# Patient Record
Sex: MALE | Race: AMERICAN INDIAN OR ALASKA NATIVE | NOT HISPANIC OR LATINO | URBAN - METROPOLITAN AREA
[De-identification: names, ages, dates, MRNs, and addresses within clinical notes are randomized per-mention and may not be internally consistent; named-entity substitution may affect disease eponyms.]

---

## 2019-09-15 ENCOUNTER — INPATIENT (INPATIENT)
Facility: HOSPITAL | Age: 59
LOS: 2 days | Discharge: HOME | End: 2019-09-18
Attending: INTERNAL MEDICINE | Admitting: INTERNAL MEDICINE
Payer: COMMERCIAL

## 2019-09-15 VITALS
DIASTOLIC BLOOD PRESSURE: 77 MMHG | SYSTOLIC BLOOD PRESSURE: 133 MMHG | TEMPERATURE: 97 F | HEART RATE: 71 BPM | OXYGEN SATURATION: 100 % | RESPIRATION RATE: 18 BRPM

## 2019-09-15 LAB
ALBUMIN SERPL ELPH-MCNC: 4.3 G/DL — SIGNIFICANT CHANGE UP (ref 3.5–5.2)
ALP SERPL-CCNC: 64 U/L — SIGNIFICANT CHANGE UP (ref 30–115)
ALT FLD-CCNC: 29 U/L — SIGNIFICANT CHANGE UP (ref 0–41)
ANION GAP SERPL CALC-SCNC: 13 MMOL/L — SIGNIFICANT CHANGE UP (ref 7–14)
AST SERPL-CCNC: 29 U/L — SIGNIFICANT CHANGE UP (ref 0–41)
BASOPHILS # BLD AUTO: 0.02 K/UL — SIGNIFICANT CHANGE UP (ref 0–0.2)
BASOPHILS NFR BLD AUTO: 0.2 % — SIGNIFICANT CHANGE UP (ref 0–1)
BILIRUB SERPL-MCNC: 0.7 MG/DL — SIGNIFICANT CHANGE UP (ref 0.2–1.2)
BUN SERPL-MCNC: 14 MG/DL — SIGNIFICANT CHANGE UP (ref 10–20)
CALCIUM SERPL-MCNC: 9.2 MG/DL — SIGNIFICANT CHANGE UP (ref 8.5–10.1)
CHLORIDE SERPL-SCNC: 95 MMOL/L — LOW (ref 98–110)
CO2 SERPL-SCNC: 22 MMOL/L — SIGNIFICANT CHANGE UP (ref 17–32)
CREAT SERPL-MCNC: 0.8 MG/DL — SIGNIFICANT CHANGE UP (ref 0.7–1.5)
EOSINOPHIL # BLD AUTO: 0.02 K/UL — SIGNIFICANT CHANGE UP (ref 0–0.7)
EOSINOPHIL NFR BLD AUTO: 0.2 % — SIGNIFICANT CHANGE UP (ref 0–8)
GLUCOSE SERPL-MCNC: 126 MG/DL — HIGH (ref 70–99)
HCT VFR BLD CALC: 41.9 % — LOW (ref 42–52)
HGB BLD-MCNC: 15.2 G/DL — SIGNIFICANT CHANGE UP (ref 14–18)
IMM GRANULOCYTES NFR BLD AUTO: 0.4 % — HIGH (ref 0.1–0.3)
LYMPHOCYTES # BLD AUTO: 1.28 K/UL — SIGNIFICANT CHANGE UP (ref 1.2–3.4)
LYMPHOCYTES # BLD AUTO: 13.6 % — LOW (ref 20.5–51.1)
MCHC RBC-ENTMCNC: 34.8 PG — HIGH (ref 27–31)
MCHC RBC-ENTMCNC: 36.3 G/DL — SIGNIFICANT CHANGE UP (ref 32–37)
MCV RBC AUTO: 95.9 FL — HIGH (ref 80–94)
MONOCYTES # BLD AUTO: 0.4 K/UL — SIGNIFICANT CHANGE UP (ref 0.1–0.6)
MONOCYTES NFR BLD AUTO: 4.2 % — SIGNIFICANT CHANGE UP (ref 1.7–9.3)
NEUTROPHILS # BLD AUTO: 7.66 K/UL — HIGH (ref 1.4–6.5)
NEUTROPHILS NFR BLD AUTO: 81.4 % — HIGH (ref 42.2–75.2)
NRBC # BLD: 0 /100 WBCS — SIGNIFICANT CHANGE UP (ref 0–0)
PLATELET # BLD AUTO: 143 K/UL — SIGNIFICANT CHANGE UP (ref 130–400)
POTASSIUM SERPL-MCNC: 4 MMOL/L — SIGNIFICANT CHANGE UP (ref 3.5–5)
POTASSIUM SERPL-SCNC: 4 MMOL/L — SIGNIFICANT CHANGE UP (ref 3.5–5)
PROT SERPL-MCNC: 6.9 G/DL — SIGNIFICANT CHANGE UP (ref 6–8)
RBC # BLD: 4.37 M/UL — LOW (ref 4.7–6.1)
RBC # FLD: 11.3 % — LOW (ref 11.5–14.5)
SODIUM SERPL-SCNC: 130 MMOL/L — LOW (ref 135–146)
TROPONIN T SERPL-MCNC: <0.01 NG/ML — SIGNIFICANT CHANGE UP
WBC # BLD: 9.42 K/UL — SIGNIFICANT CHANGE UP (ref 4.8–10.8)
WBC # FLD AUTO: 9.42 K/UL — SIGNIFICANT CHANGE UP (ref 4.8–10.8)

## 2019-09-15 PROCEDURE — 71046 X-RAY EXAM CHEST 2 VIEWS: CPT | Mod: 26

## 2019-09-15 PROCEDURE — 99218: CPT

## 2019-09-15 PROCEDURE — 93010 ELECTROCARDIOGRAM REPORT: CPT | Mod: 76

## 2019-09-15 RX ORDER — ASPIRIN/CALCIUM CARB/MAGNESIUM 324 MG
162 TABLET ORAL ONCE
Refills: 0 | Status: COMPLETED | OUTPATIENT
Start: 2019-09-15 | End: 2019-09-15

## 2019-09-15 RX ORDER — SODIUM CHLORIDE 9 MG/ML
1000 INJECTION INTRAMUSCULAR; INTRAVENOUS; SUBCUTANEOUS ONCE
Refills: 0 | Status: COMPLETED | OUTPATIENT
Start: 2019-09-15 | End: 2019-09-15

## 2019-09-15 RX ORDER — MECLIZINE HCL 12.5 MG
25 TABLET ORAL ONCE
Refills: 0 | Status: COMPLETED | OUTPATIENT
Start: 2019-09-15 | End: 2019-09-15

## 2019-09-15 RX ADMIN — SODIUM CHLORIDE 2000 MILLILITER(S): 9 INJECTION INTRAMUSCULAR; INTRAVENOUS; SUBCUTANEOUS at 19:14

## 2019-09-15 RX ADMIN — Medication 162 MILLIGRAM(S): at 19:14

## 2019-09-15 RX ADMIN — Medication 25 MILLIGRAM(S): at 19:14

## 2019-09-15 NOTE — ED ADULT TRIAGE NOTE - CHIEF COMPLAINT QUOTE
c/o dizziness, nausea and one episode of vomiting since this am. denies any sob, cp, HA. sent for abnormal EKG

## 2019-09-15 NOTE — ED ADULT NURSE NOTE - OBJECTIVE STATEMENT
Pt complaining of dizziness since this morning. Sent in from primary office, for abnormal EKG. Pt also has decreased appetite with 1 episode of vomiting today.

## 2019-09-15 NOTE — ED PROVIDER NOTE - OBJECTIVE STATEMENT
59 no pmh, former smoker here for dizziness. pt states this morning he felt dizzy which he described as room spinning. pt states sx worse w/ moving head left. sx made worse w/ eating breakfast. pt endorsed vom x1.  pt denies numbness, weakness, ha, visual complaints, ear pain, tinnitus, chest pain, sob. pt went to pcp. ekg at pcp office had isolated RUSSEL in V2 once which improved w/ serial ekg. pt sent in for further eval.  here pt feeling much better but not baseline.

## 2019-09-15 NOTE — ED PROVIDER NOTE - PLAN OF CARE
dizziness likely peripheral vertigo, normal neuro exam. ekg from pcp office reviewed- isolated RUSSEL, no RUSSEL on ED ekg. will get basic labs, supp care for vertigo, trop. if negative, obs for acs r/o

## 2019-09-15 NOTE — ED PROVIDER NOTE - PHYSICAL EXAMINATION
PHYSICAL EXAM:    Constitutional: awake, alert, NAD  Eyes: EOMI, no conj injection  HENT: NC AT   Back: no c/t/l spine ttp  Respiratory: no respiratory distress, breath sounds equal b/l, no wheezing, rhonchi or stridor.   Cardiovascular: RRR nml S1S2  Gastrointestinal: soft, no masses, nontender, nondistended. No guarding or rebound.   Extremities: no peripheral edema  Neurological: AAOx3, CN II-XII grossly intact, no focal numbness or weakness. normal FTN, gait normal, mild horz nystagmus on horz head movement  Skin: no rash  Musculoskeletal: no gross deformity

## 2019-09-15 NOTE — ED PROVIDER NOTE - CLINICAL SUMMARY MEDICAL DECISION MAKING FREE TEXT BOX
pt here for vertiginous dizziness since the morning. pt went to pcp who ref pt to ed for abn ekg. in ED, ekg wnl x2, trop neg, sx improving w/ fluids/meclizine. no fnd on serial neuro exam. will place in obs for acs r/o given EKG at pcp's office

## 2019-09-15 NOTE — ED PROVIDER NOTE - ST/T WAVE
no RUSSEL, no contiguous TWI, borderline Wellen's in V2 no RUSSEL, no contiguous TWI no STEMI, no changes from previous, no contiguous TWI

## 2019-09-15 NOTE — ED PROVIDER NOTE - PROGRESS NOTE DETAILS
pt feeling completely better w/ fluids and meclizine. given borderline EKG at pcp's office and no previous card w/u, will place in obs for acs r/o, 2nd trop, stress test. discussed w/ pt and family

## 2019-09-15 NOTE — ED PROVIDER NOTE - NS ED ROS FT
Constitutional: no fever or rigors  Eyes: no eye redness, acute visual change  ENMT: no ear pain, no throat pain  Card: no chest pain, no palpitations  Pulm: no cough, no shortness of breath  GI: no abdominal pain, pos nausea and vomiting  : no dysuria or hematuria  MSK: no limitation in range of motion, no neck pain  Skin: no rash, no abrasion  Neuro: no numbness, no weakness, +vertiginous dizziness  Heme/Onc: no easy bruising, no bleeding tendency   Allergic: no hives, no throat swelling

## 2019-09-15 NOTE — ED PROVIDER NOTE - CARE PLAN
Assessment and plan of treatment:	dizziness likely peripheral vertigo, normal neuro exam. ekg from pcp office reviewed- isolated RUSSEL, no RUSSEL on ED ekg. will get basic labs, supp care for vertigo, trop. if negative, obs for acs r/o Principal Discharge DX:	Vertigo  Assessment and plan of treatment:	dizziness likely peripheral vertigo, normal neuro exam. ekg from pcp office reviewed- isolated RUSSEL, no RUSSEL on ED ekg. will get basic labs, supp care for vertigo, trop. if negative, obs for acs r/o

## 2019-09-15 NOTE — ED CDU PROVIDER INITIAL DAY NOTE - OBJECTIVE STATEMENT
58y/o male with no significant pmh, c/o dizziness since this am. pt. describes at as "spinning yonas" + nausea dn vomiting. denies cp, sob, fever, cough, ha, abdominal pain. pt. went to his pmd, ekg was done which was abnormal so pt. was sent to er for eval. after pt. received meclizine and ivf pt. now is asymptomatic. ex smoker. translated by son via phone. 60y/o male with no significant pmh, c/o dizziness since this am. pt. describes at as "spinning room" + nausea dn vomiting. denies cp, sob, fever, cough, ha, abdominal pain. pt. went to his pmd, ekg was done which was abnormal so pt. was sent to er for eval. after pt. received meclizine and ivf pt. now is asymptomatic. ex smoker. translated by son via phone. 60y/o male with no significant pmh, c/o dizziness since this am. pt. describes at as "spinning room" + nausea and vomiting. denies cp, sob, fever, cough, ha, abdominal pain. pt. went to his pmd, ekg was done which was abnormal so pt. was sent to er for eval. after pt. received meclizine and ivf pt. now is asymptomatic. ex smoker. translated by son via phone.

## 2019-09-16 LAB — TROPONIN T SERPL-MCNC: <0.01 NG/ML — SIGNIFICANT CHANGE UP

## 2019-09-16 PROCEDURE — 99222 1ST HOSP IP/OBS MODERATE 55: CPT

## 2019-09-16 PROCEDURE — 75574 CT ANGIO HRT W/3D IMAGE: CPT | Mod: 26

## 2019-09-16 PROCEDURE — 93010 ELECTROCARDIOGRAM REPORT: CPT

## 2019-09-16 PROCEDURE — 99217: CPT

## 2019-09-16 PROCEDURE — 99223 1ST HOSP IP/OBS HIGH 75: CPT | Mod: AI

## 2019-09-16 PROCEDURE — 93010 ELECTROCARDIOGRAM REPORT: CPT | Mod: 77

## 2019-09-16 RX ORDER — METOPROLOL TARTRATE 50 MG
50 TABLET ORAL ONCE
Refills: 0 | Status: COMPLETED | OUTPATIENT
Start: 2019-09-16 | End: 2019-09-16

## 2019-09-16 RX ORDER — SODIUM CHLORIDE 9 MG/ML
1000 INJECTION INTRAMUSCULAR; INTRAVENOUS; SUBCUTANEOUS
Refills: 0 | Status: DISCONTINUED | OUTPATIENT
Start: 2019-09-16 | End: 2019-09-17

## 2019-09-16 RX ORDER — ENOXAPARIN SODIUM 100 MG/ML
40 INJECTION SUBCUTANEOUS DAILY
Refills: 0 | Status: DISCONTINUED | OUTPATIENT
Start: 2019-09-16 | End: 2019-09-18

## 2019-09-16 RX ORDER — ATORVASTATIN CALCIUM 80 MG/1
80 TABLET, FILM COATED ORAL AT BEDTIME
Refills: 0 | Status: DISCONTINUED | OUTPATIENT
Start: 2019-09-16 | End: 2019-09-18

## 2019-09-16 RX ORDER — ASPIRIN/CALCIUM CARB/MAGNESIUM 324 MG
81 TABLET ORAL DAILY
Refills: 0 | Status: DISCONTINUED | OUTPATIENT
Start: 2019-09-16 | End: 2019-09-18

## 2019-09-16 RX ORDER — SODIUM CHLORIDE 9 MG/ML
1000 INJECTION, SOLUTION INTRAVENOUS
Refills: 0 | Status: DISCONTINUED | OUTPATIENT
Start: 2019-09-16 | End: 2019-09-16

## 2019-09-16 RX ADMIN — SODIUM CHLORIDE 75 MILLILITER(S): 9 INJECTION INTRAMUSCULAR; INTRAVENOUS; SUBCUTANEOUS at 18:36

## 2019-09-16 RX ADMIN — SODIUM CHLORIDE 75 MILLILITER(S): 9 INJECTION INTRAMUSCULAR; INTRAVENOUS; SUBCUTANEOUS at 21:27

## 2019-09-16 RX ADMIN — ATORVASTATIN CALCIUM 80 MILLIGRAM(S): 80 TABLET, FILM COATED ORAL at 21:27

## 2019-09-16 RX ADMIN — Medication 50 MILLIGRAM(S): at 08:01

## 2019-09-16 RX ADMIN — Medication 81 MILLIGRAM(S): at 18:36

## 2019-09-16 NOTE — ED CDU PROVIDER DISPOSITION NOTE - CLINICAL COURSE
Patient observed in EDOU.  no ectopy on the monitor. CEx2 negative. EKG x2 no ischemia. CCTA positive. for LAD stenosis. card eval'd the pt.  needs cath.  also recommending CT H. pt withtout any symtoms. nl gait. no fnd. ordered. stable for tele.

## 2019-09-16 NOTE — ED CDU PROVIDER SUBSEQUENT DAY NOTE - PROGRESS NOTE DETAILS
pt. is awake and alert. presently asymptomatic. 2nd trop is pending. will get ccta in the morning. pt seen bedside, NAD, no complaints overnight, asymptomatic. Negative cardiac enzymes x2 and nl ekg. pt scheduled to go for CCTA. Will continue to monitor patient. CCTA positive. for LAD stenosis. card eval'd the pt.  needs cath.  also recommending CT H. pt withtout any symtoms. nl gait. no fnd. ordered. stable for tele.

## 2019-09-16 NOTE — ED CDU PROVIDER SUBSEQUENT DAY NOTE - ATTENDING CONTRIBUTION TO CARE
CCTA positive. for LAD stenosis. card eval'd the pt.  needs cath.  also recommending CT H. pt withtout any symtoms. nl gait. no fnd. ordered. stable for tele.

## 2019-09-16 NOTE — CONSULT NOTE ADULT - ASSESSMENT
59 yrs old with no PMH  prsented to ED for 1 day history of dizziness and Headache , associated with Nausea , mainly upon movement , resolving at rest . pt denied any loc , blurred vision , tinnitis , numbness or weakness.  in ED, CCTA was done and showed mod to severe prox LAD lesion . pt denied any chest pain , sob , palpitation.    dizziness/HA: r/o CVA vs BPPV  Abnormal CCTA : with mod to severe prox LAD lesion  hyponatremia likely hypovolemic etiology from nausea and decrease po intake    plan  admit to telemetry  CTH  to r/o CVA  start IV fluids 75cc/hr until tomorrow  please repeat BMP in AM  npo after midnight for cath tomorrow   start asa 81mg daily  atorvasastin 80mg qhs  check 2d echo , Hba1c , lipid profile  will follow 59 yrs old with no PMH  prsented to ED for 1 day history of dizziness and Headache , associated with Nausea , mainly upon movement , resolving at rest . pt denied any loc , blurred vision , tinnitis , numbness or weakness.  in ED, CCTA was done and showed mod to severe prox LAD lesion . pt denied any chest pain , sob , palpitation.    Dizziness/HA: r/o CVA vs BPPV  Abnormal CCTA: Mod to severe prox LAD lesion  Hyponatremia likely hypovolemic etiology from nausea and decrease po intake    Plan:  admit to telemetry  CTH to r/o CVA  start IV fluids 75cc/hr until tomorrow  please repeat BMP in AM  npo after midnight for cath tomorrow   start asa 81mg daily  atorvastatin 80mg qhs  check 2d echo , Hba1c , lipid profile

## 2019-09-16 NOTE — ED ADULT NURSE REASSESSMENT NOTE - NS ED NURSE REASSESS COMMENT FT1
Pt resting in bed comfortably at this time. Endorses improvement with c/o dizziness. Able to ambulate with standby assist. Pending CCTA at this time.

## 2019-09-16 NOTE — H&P ADULT - HISTORY OF PRESENT ILLNESS
58 y/o M w/ no PMHx presents w/ dizziness x1 day. PT describes dizziness as room spinning but he is steady on his feet. Pt states he did not have any hearing loss or tinnitus a/w it. He noticed dizzinss when he changed posture from lying down to standing up. episode lasted 2-3 minutes but recurred which is why he came to the ED. Pt wnet to PCP first who did an ekg and found some abnormalities and was referred to ED. Denies any chest pain, dizziness, confusion, ams, viral symptoms, or any focal deficits. 60 y/o M w/ no PMHx presents w/ dizziness x1 day. PT describes dizziness as room spinning but he is steady on his feet. Pt states he did not have any hearing loss or tinnitus a/w it. He noticed dizzinss when he changed posture from lying down to standing up at home. Episode lasted 2-3 minutes but recurred which is why he came to the ED. Pt went to PCP first who did an ekg and found some abnormalities and was referred to ED. Denies any chest pain, dizziness, confusion, ams, viral symptoms, or any focal deficits.

## 2019-09-16 NOTE — CONSULT NOTE ADULT - ATTENDING COMMENTS
Coronary CTA: Mod-to-severe pLAD stenosis, CACS = 84  EKG: NSR, No ischemia  No angina  Dizziness/HA    Recommend:  - NPO for [facility] tomorrow  - Medical therapy as above  - CT Head today

## 2019-09-16 NOTE — H&P ADULT - ATTENDING COMMENTS
as above post my edits    discussed with resident at time of admission earlier today    doubt dizziness is anything more serious then benign vertigo but in light of unexpected suspected cad patient at higher risk of vertebrobasilar arterial disease    follow up head/neck ct    cath tuesday    expected dispo is home

## 2019-09-16 NOTE — CONSULT NOTE ADULT - SUBJECTIVE AND OBJECTIVE BOX
HPI:    59 yrs old with no PMH  prsented to ED for 1 day history of dizziness and Headache , associated with Nausea , mainly upon movement , resolving at rest . pt denied any loc , blurred vision , tinnitis , numbness or weakness.  in ED, CCTA was done and showed mod to severe prox LAD lesion . pt denied any chest pain , sob , palpitation.    PAST MEDICAL & SURGICAL HISTORY  neg    FAMILY HISTORY:  FAMILY HISTORY:  neg for CAD    SOCIAL HISTORY:  [-]smoker  [-]Alcohol  [-]Drug    ALLERGIES:  Allergy Status Unknown      HOME MEDICATIONS:  Home Medications:  none    VITALS:   T(F): 98.2 (09-16 @ 09:05), Max: 98.2 (09-16 @ 09:05)  HR: 64 (09-16 @ 09:05) (60 - 71)  BP: 113/66 (09-16 @ 09:05) (113/66 - 133/77)  BP(mean): --  RR: 18 (09-16 @ 09:05) (18 - 18)  SpO2: 99% (09-16 @ 09:05) (97% - 100%)    I&O's Summary      REVIEW OF SYSTEMS:  CONSTITUTIONAL: No weakness, fevers or chills  EYES: see HPI  ENT: No vertigo or throat pain   NECK: No pain or stiffness  RESPIRATORY: No cough, wheezing, hemoptysis; No shortness of breath  CARDIOVASCULAR: No chest pain or palpitations  GASTROINTESTINAL: No abdominal or epigastric pain. No  hematemesis; No diarrhea or constipation. No melena or hematochezia.  GENITOURINARY: No dysuria, frequency or hematuria  NEUROLOGICAL: No numbness or weakness  SKIN: No itching, no rashes  MSK: no joint pain    PHYSICAL EXAM:  NEURO: patient is awake , alert and oriented, 5/5 motor power in all extremities , no decrease sensation  GEN: Not in acute distress  NECK: no thyroid enlargement, no JVD  LUNGS: Clear to auscultation bilaterally   CARDIOVASCULAR: S1/S2 present, RRR , no murmurs   ABD: Soft, non-tender, non-distended, +BS  EXT: No FANTA  SKIN: Intact    LABS:                        15.2   9.42  )-----------( 143      ( 15 Sep 2019 19:05 )             41.9     09-15    130<L>  |  95<L>  |  14  ----------------------------<  126<H>  4.0   |  22  |  0.8    Ca    9.2      15 Sep 2019 19:05    TPro  6.9  /  Alb  4.3  /  TBili  0.7  /  DBili  x   /  AST  29  /  ALT  29  /  AlkPhos  64  09-15      Troponin T, Serum: <0.01 ng/mL (09-16-19 @ 00:16)  Troponin T, Serum: <0.01 ng/mL (09-15-19 @ 19:05)    CARDIAC MARKERS ( 16 Sep 2019 00:16 )  x     / <0.01 ng/mL / x     / x     / x      CARDIAC MARKERS ( 15 Sep 2019 19:05 )  x     / <0.01 ng/mL / x     / x     / x            RADIOLOGY:  -CXR: < from: Xray Chest 2 Views PA/Lat (09.15.19 @ 19:35) >  Impression:      No radiographic evidence of acute cardiopulmonary disease.    < end of copied text >    CCTA: < from: CT Heart with Coronaries (09.16.19 @ 10:18) >  1.  Atherosclerotic changes of the above-mentioned proximal segment of   LAD with moderate tosevere stenosis.   2.  Normal right and left circumflex arteries.  3.  Total coronary calcium score is 84.  For a 59 years old male , this   corresponds to 70 URIBE percentile rank.     < end of copied text >      ECG:  sinus with no ischemic changes HPI:    60 yo M with no significant PMH presented to ED for 1 day history of dizziness and headache associated with nausea , mainly upon movement , resolving at rest . pt denied any loc , blurred vision , tinnitis , numbness or weakness.  in ED, CCTA was done and showed mod to severe prox LAD lesion. pt denied any chest pain , sob , palpitation.      PAST MEDICAL & SURGICAL HISTORY  None    No pertinent family history of premature CAD in first degree relatives    SOCIAL HISTORY: Denies EtOH, smoking or drug use    ALLERGIES:  Allergy Status Unknown      HOME MEDICATIONS:  Home Medications: None      REVIEW OF SYSTEMS:  CONSTITUTIONAL: No fever, weight loss, fatigue  NECK: No pain or stiffness  RESPIRATORY: No cough, wheezing, shortness of breath  CARDIOVASCULAR: See HPI  GASTROINTESTINAL: No abdominal/epigastric pain, nausea, vomiting, hematemesis, diarrhea, constipation, melena or hematochezia  GENITOURINARY: No dysuria, frequency, hematuria, incontinence  NEUROLOGICAL: No headaches, memory loss, loss of strength, numbness, tremors  SKIN: No itching, burning, rashes, lesions   ENDOCRINE: No heat/cold intolerance or hair loss  MUSCULOSKELETAL: No joint pain or swelling  HEME/LYMPH: No easy bruising or bleeding gums    VITALS:   T(F): 98.2 (09-16 @ 09:05), Max: 98.2 (09-16 @ 09:05)  HR: 64 (09-16 @ 09:05) (60 - 71)  BP: 113/66 (09-16 @ 09:05) (113/66 - 133/77)  BP(mean): --  RR: 18 (09-16 @ 09:05) (18 - 18)  SpO2: 99% (09-16 @ 09:05) (97% - 100%)    PHYSICAL EXAM:  General: NAD, AAOx3  HEENT: NCAT, EOMI  Neck: supple, no JVD  CV: RRR, S1S2 nl, no murmurs, no edema  Respiratory: CTA bilaterally, no wheeze, rales or rhonchi	  Abdomen: soft, NT/ND, +BS  Extremities: ROM nl, 2+ PP bilaterally  Neuro: Nonfocal      LABS:                        15.2   9.42  )-----------( 143      ( 15 Sep 2019 19:05 )             41.9     09-15    130<L>  |  95<L>  |  14  ----------------------------<  126<H>  4.0   |  22  |  0.8    Ca    9.2      15 Sep 2019 19:05    TPro  6.9  /  Alb  4.3  /  TBili  0.7  /  DBili  x   /  AST  29  /  ALT  29  /  AlkPhos  64  09-15      Troponin T, Serum: <0.01 ng/mL (09-16-19 @ 00:16)  Troponin T, Serum: <0.01 ng/mL (09-15-19 @ 19:05)          RADIOLOGY:  -CXR: < from: Xray Chest 2 Views PA/Lat (09.15.19 @ 19:35) >  Impression:      No radiographic evidence of acute cardiopulmonary disease.    < end of copied text >        CCTA: < from: CT Heart with Coronaries (09.16.19 @ 10:18) >  1.  Atherosclerotic changes of the above-mentioned proximal segment of   LAD with moderate to severe stenosis.   2.  Normal right and left circumflex arteries.  3.  Total coronary calcium score is 84.  For a 59 years old male , this   corresponds to 70 URIBE percentile rank.     < end of copied text >

## 2019-09-16 NOTE — H&P ADULT - ASSESSMENT
58 y/o M w/ no PMHx presents w/ dizziness x1 day. PT describes dizziness as room spinning but he is steady on his feet. pt also had abnormal ekg changes at PCP office  Pt had CCTA done in ED which showed mod to severe prox LAD lesion. Pt does not have any chest pain    #Peripheral vertigo possibly d/t BPPV - r/o vertebrobasilar insufficiency  - NIHSS: 0  - ED ordered CT Head  - will also obtain CT Angio H &N  - Start pt on meclizine 25mg if symptoms persist    #Moderate to Severe proximal LAD lesion - asymptomatic  - ekg no abnormalities  - CE x2: negative  - CCTA showed evidence of LAD stenosis  - cardio on board  - c/w NS 75 cc/hr  - npo after midnight for cath in am  - will start asa 81 and lipitor 80  - will get Echo  - will get A1c and lipid profile    #Activiy: OOBC  #DIet: DASH and NPO after midgnith  #DVT/GI PPX: Lovenox/not indicated  #Dispo: Home when stable  #Code; Full  #CHG

## 2019-09-17 LAB
ANION GAP SERPL CALC-SCNC: 8 MMOL/L — SIGNIFICANT CHANGE UP (ref 7–14)
BASOPHILS # BLD AUTO: 0.04 K/UL — SIGNIFICANT CHANGE UP (ref 0–0.2)
BASOPHILS NFR BLD AUTO: 0.6 % — SIGNIFICANT CHANGE UP (ref 0–1)
BUN SERPL-MCNC: 14 MG/DL — SIGNIFICANT CHANGE UP (ref 10–20)
CALCIUM SERPL-MCNC: 8.6 MG/DL — SIGNIFICANT CHANGE UP (ref 8.5–10.1)
CHLORIDE SERPL-SCNC: 109 MMOL/L — SIGNIFICANT CHANGE UP (ref 98–110)
CHOLEST SERPL-MCNC: 126 MG/DL — SIGNIFICANT CHANGE UP (ref 100–200)
CO2 SERPL-SCNC: 25 MMOL/L — SIGNIFICANT CHANGE UP (ref 17–32)
CREAT SERPL-MCNC: 0.8 MG/DL — SIGNIFICANT CHANGE UP (ref 0.7–1.5)
EOSINOPHIL # BLD AUTO: 0.18 K/UL — SIGNIFICANT CHANGE UP (ref 0–0.7)
EOSINOPHIL NFR BLD AUTO: 2.9 % — SIGNIFICANT CHANGE UP (ref 0–8)
ESTIMATED AVERAGE GLUCOSE: 105 MG/DL — SIGNIFICANT CHANGE UP (ref 68–114)
GLUCOSE SERPL-MCNC: 98 MG/DL — SIGNIFICANT CHANGE UP (ref 70–99)
HBA1C BLD-MCNC: 5.3 % — SIGNIFICANT CHANGE UP (ref 4–5.6)
HCT VFR BLD CALC: 40.9 % — LOW (ref 42–52)
HCV AB S/CO SERPL IA: 0.15 S/CO — SIGNIFICANT CHANGE UP (ref 0–0.99)
HCV AB SERPL-IMP: SIGNIFICANT CHANGE UP
HDLC SERPL-MCNC: 36 MG/DL — LOW
HGB BLD-MCNC: 14.2 G/DL — SIGNIFICANT CHANGE UP (ref 14–18)
IMM GRANULOCYTES NFR BLD AUTO: 0.5 % — HIGH (ref 0.1–0.3)
LIPID PNL WITH DIRECT LDL SERPL: 71 MG/DL — SIGNIFICANT CHANGE UP (ref 4–129)
LYMPHOCYTES # BLD AUTO: 2.51 K/UL — SIGNIFICANT CHANGE UP (ref 1.2–3.4)
LYMPHOCYTES # BLD AUTO: 40.6 % — SIGNIFICANT CHANGE UP (ref 20.5–51.1)
MCHC RBC-ENTMCNC: 34.1 PG — HIGH (ref 27–31)
MCHC RBC-ENTMCNC: 34.7 G/DL — SIGNIFICANT CHANGE UP (ref 32–37)
MCV RBC AUTO: 98.3 FL — HIGH (ref 80–94)
MONOCYTES # BLD AUTO: 0.6 K/UL — SIGNIFICANT CHANGE UP (ref 0.1–0.6)
MONOCYTES NFR BLD AUTO: 9.7 % — HIGH (ref 1.7–9.3)
NEUTROPHILS # BLD AUTO: 2.82 K/UL — SIGNIFICANT CHANGE UP (ref 1.4–6.5)
NEUTROPHILS NFR BLD AUTO: 45.7 % — SIGNIFICANT CHANGE UP (ref 42.2–75.2)
NRBC # BLD: 0 /100 WBCS — SIGNIFICANT CHANGE UP (ref 0–0)
PLATELET # BLD AUTO: 144 K/UL — SIGNIFICANT CHANGE UP (ref 130–400)
POTASSIUM SERPL-MCNC: 4.1 MMOL/L — SIGNIFICANT CHANGE UP (ref 3.5–5)
POTASSIUM SERPL-SCNC: 4.1 MMOL/L — SIGNIFICANT CHANGE UP (ref 3.5–5)
RBC # BLD: 4.16 M/UL — LOW (ref 4.7–6.1)
RBC # FLD: 11.9 % — SIGNIFICANT CHANGE UP (ref 11.5–14.5)
SODIUM SERPL-SCNC: 142 MMOL/L — SIGNIFICANT CHANGE UP (ref 135–146)
TOTAL CHOLESTEROL/HDL RATIO MEASUREMENT: 3.5 RATIO — LOW (ref 4–5.5)
TRIGL SERPL-MCNC: 128 MG/DL — SIGNIFICANT CHANGE UP (ref 10–149)
WBC # BLD: 6.18 K/UL — SIGNIFICANT CHANGE UP (ref 4.8–10.8)
WBC # FLD AUTO: 6.18 K/UL — SIGNIFICANT CHANGE UP (ref 4.8–10.8)

## 2019-09-17 PROCEDURE — 99233 SBSQ HOSP IP/OBS HIGH 50: CPT

## 2019-09-17 PROCEDURE — 70551 MRI BRAIN STEM W/O DYE: CPT | Mod: 26

## 2019-09-17 PROCEDURE — 93010 ELECTROCARDIOGRAM REPORT: CPT | Mod: 59

## 2019-09-17 PROCEDURE — 93458 L HRT ARTERY/VENTRICLE ANGIO: CPT | Mod: 26

## 2019-09-17 RX ORDER — INFLUENZA VIRUS VACCINE 15; 15; 15; 15 UG/.5ML; UG/.5ML; UG/.5ML; UG/.5ML
0.5 SUSPENSION INTRAMUSCULAR ONCE
Refills: 0 | Status: DISCONTINUED | OUTPATIENT
Start: 2019-09-17 | End: 2019-09-18

## 2019-09-17 RX ORDER — SODIUM CHLORIDE 9 MG/ML
1000 INJECTION INTRAMUSCULAR; INTRAVENOUS; SUBCUTANEOUS
Refills: 0 | Status: DISCONTINUED | OUTPATIENT
Start: 2019-09-17 | End: 2019-09-18

## 2019-09-17 RX ADMIN — Medication 81 MILLIGRAM(S): at 11:59

## 2019-09-17 RX ADMIN — ATORVASTATIN CALCIUM 80 MILLIGRAM(S): 80 TABLET, FILM COATED ORAL at 22:43

## 2019-09-17 NOTE — PROGRESS NOTE ADULT - SUBJECTIVE AND OBJECTIVE BOX
PRE-OPERATIVE DAY OF PROCEDURE EVALUATION NOTE:  I have personally seen and examined the patient. I agree with the history and physicial which I have reviewed and noted any changes below. signed electronically by Dr René Dexter 09-17-19 @ 14:36

## 2019-09-17 NOTE — PROGRESS NOTE ADULT - ATTENDING COMMENTS
pt prefers daughter Laina to translate ( on the phone)   no cp, no dizziness today  for cath today.   #Progress Note Handoff  Pending (specify):  Consults cardio, and neuro, tests: Cardiac cath. poss MRI brain, may need MRA head and neck   Family discussion: silverio pt and his wife at the bedside and daughter Laina on the phone, full code. they agree with plan but anxious.   Disposition: Home_

## 2019-09-17 NOTE — PROGRESS NOTE ADULT - SUBJECTIVE AND OBJECTIVE BOX
FRANKIE BREAUX 59y Male  MRN#: [id number]   CODE STATUS: FULL      SUBJECTIVE    Overnight events - No acute events    Subjective complaints - Pt does not understand English, spoke with the patient with the help of daughter translating- Pt does not have any new complaints this am. No dizziness, chest pain.      OBJECTIVE  PAST MEDICAL & SURGICAL HISTORY  No pertinent past medical history  No significant past surgical history                                            -----------------------------------------------------------  ALLERGIES:  Allergy Status Unknown                                            ------------------------------------------------------------  MEDICATIONS:  STANDING MEDICATIONS  aspirin enteric coated 81 milliGRAM(s) Oral daily  atorvastatin 80 milliGRAM(s) Oral at bedtime  enoxaparin Injectable 40 milliGRAM(s) SubCutaneous daily  influenza   Vaccine 0.5 milliLiter(s) IntraMuscular once  sodium chloride 0.9%. 1000 milliLiter(s) IV Continuous <Continuous>    PRN MEDICATIONS                                            ------------------------------------------------------------  VITAL SIGNS: Last 24 Hours  T(C): 36 (17 Sep 2019 07:51), Max: 36 (16 Sep 2019 23:10)  T(F): 96.8 (17 Sep 2019 07:51), Max: 96.8 (16 Sep 2019 23:10)  HR: 61 (17 Sep 2019 07:51) (58 - 69)  BP: 122/63 (17 Sep 2019 07:51) (100/58 - 122/63)  RR: 18 (17 Sep 2019 07:51) (18 - 18)  SpO2: 97% (17 Sep 2019 07:51) (96% - 97%)      09-16-19 @ 07:01  -  09-17-19 @ 07:00  --------------------------------------------------------  IN: 825 mL / OUT: 0 mL / NET: 825 mL                                             --------------------------------------------------------------  LABS:                        14.2   6.18  )-----------( 144      ( 17 Sep 2019 05:41 )             40.9     09-17    142  |  109  |  14  ----------------------------<  98  4.1   |  25  |  0.8    Ca    8.6      17 Sep 2019 05:41    TPro  6.9  /  Alb  4.3  /  TBili  0.7  /  DBili  x   /  AST  29  /  ALT  29  /  AlkPhos  64  09-15      CARDIAC MARKERS ( 16 Sep 2019 00:16 )  x     / <0.01 ng/mL / x     / x     / x      CARDIAC MARKERS ( 15 Sep 2019 19:05 )  x     / <0.01 ng/mL / x     / x     / x                                                  -------------------------------------------------------------  RADIOLOGY:  < from: CT Heart with Coronaries (09.16.19 @ 10:18) >  IMPRESSION:    1.  Atherosclerotic changes of the above-mentioned proximal segment of   LAD with moderate tosevere stenosis.   2.  Normal right and left circumflex arteries.  3.  Total coronary calcium score is 84.  For a 59 years old male , this   corresponds to 70 URIBE percentile rank.     < from: Xray Chest 2 Views PA/Lat (09.15.19 @ 19:35) >  Impression:      No radiographic evidence of acute cardiopulmonary disease.                                            --------------------------------------------------------------    PHYSICAL EXAM:    GENERAL: NAD, AAOx3  HEENT:  Atraumatic, Normocephalic.  PERRLA, conjunctiva and sclera clear, No JVD  PULMONARY: Clear to auscultation bilaterally; No wheeze  CARDIOVASCULAR: Regular rate and rhythm  GASTROINTESTINAL: Soft, Nontender, Nondistended; Bowel sounds present  MUSCULOSKELETAL:  2+ Peripheral Pulses, No clubbing, cyanosis, or edema  NEUROLOGY: non-focal  SKIN: No rashes or lesions                                           --------------------------------------------------------------    ASSESSMENT & PLAN      58 y/o M w/ no PMHx presents w/ dizziness x1 day. PT describes dizziness as room spinning but he is steady on his feet. pt also had abnormal ekg changes at PCP office  Pt had CCTA done in ED which showed mod to severe prox LAD lesion. Pt does not have any chest pain    #Peripheral vertigo possibly d/t BPPV - r/o vertebrobasilar insufficiency  - NIHSS: 0  - Will get MR head - If positive for findings- MRA Head and neck  - Start pt on meclizine 25mg if symptoms persist    #Moderate to Severe proximal LAD lesion - asymptomatic  - ekg no abnormalities; CE x2: negative  - CCTA showed evidence of LAD stenosis  - npo after midnight for cath in am  - C/w asa 81 and lipitor 80  - F/u  Echo  -  A1c and lipid profile    #Activiy: OOBC  #DIet: DASH and NPO after midgnith  #DVT/GI PPX: Lovenox/not indicated  #Dispo: Home when stable  #Code; Full  #CHG FRANKIE BREAUX 59y Male  MRN#: [id number]   CODE STATUS: FULL      SUBJECTIVE    Overnight events - No acute events    Subjective complaints - Pt does not understand English, spoke with the patient with the help of daughter translating- Pt does not have any new complaints this am. No dizziness, chest pain.      OBJECTIVE  PAST MEDICAL & SURGICAL HISTORY  No pertinent past medical history  No significant past surgical history                                            -----------------------------------------------------------  ALLERGIES:  Allergy Status Unknown                                            ------------------------------------------------------------  MEDICATIONS:  STANDING MEDICATIONS  aspirin enteric coated 81 milliGRAM(s) Oral daily  atorvastatin 80 milliGRAM(s) Oral at bedtime  enoxaparin Injectable 40 milliGRAM(s) SubCutaneous daily  influenza   Vaccine 0.5 milliLiter(s) IntraMuscular once  sodium chloride 0.9%. 1000 milliLiter(s) IV Continuous <Continuous>    PRN MEDICATIONS                                            ------------------------------------------------------------  VITAL SIGNS: Last 24 Hours  T(C): 36 (17 Sep 2019 07:51), Max: 36 (16 Sep 2019 23:10)  T(F): 96.8 (17 Sep 2019 07:51), Max: 96.8 (16 Sep 2019 23:10)  HR: 61 (17 Sep 2019 07:51) (58 - 69)  BP: 122/63 (17 Sep 2019 07:51) (100/58 - 122/63)  RR: 18 (17 Sep 2019 07:51) (18 - 18)  SpO2: 97% (17 Sep 2019 07:51) (96% - 97%)      09-16-19 @ 07:01  -  09-17-19 @ 07:00  --------------------------------------------------------  IN: 825 mL / OUT: 0 mL / NET: 825 mL                                             --------------------------------------------------------------  LABS:                        14.2   6.18  )-----------( 144      ( 17 Sep 2019 05:41 )             40.9     09-17    142  |  109  |  14  ----------------------------<  98  4.1   |  25  |  0.8    Ca    8.6      17 Sep 2019 05:41    TPro  6.9  /  Alb  4.3  /  TBili  0.7  /  DBili  x   /  AST  29  /  ALT  29  /  AlkPhos  64  09-15      CARDIAC MARKERS ( 16 Sep 2019 00:16 )  x     / <0.01 ng/mL / x     / x     / x      CARDIAC MARKERS ( 15 Sep 2019 19:05 )  x     / <0.01 ng/mL / x     / x     / x                                                  -------------------------------------------------------------  RADIOLOGY:  < from: CT Heart with Coronaries (09.16.19 @ 10:18) >  IMPRESSION:    1.  Atherosclerotic changes of the above-mentioned proximal segment of   LAD with moderate tosevere stenosis.   2.  Normal right and left circumflex arteries.  3.  Total coronary calcium score is 84.  For a 59 years old male , this   corresponds to 70 URIBE percentile rank.     < from: Xray Chest 2 Views PA/Lat (09.15.19 @ 19:35) >  Impression:      No radiographic evidence of acute cardiopulmonary disease.                                            --------------------------------------------------------------    PHYSICAL EXAM:    GENERAL: NAD, AAOx3  HEENT:  Atraumatic, Normocephalic.  PERRLA, conjunctiva and sclera clear, No JVD  PULMONARY: Clear to auscultation bilaterally; No wheeze  CARDIOVASCULAR: Regular rate and rhythm  GASTROINTESTINAL: Soft, Nontender, Nondistended; Bowel sounds present  MUSCULOSKELETAL:  2+ Peripheral Pulses, No clubbing, cyanosis, or edema  NEUROLOGY: non-focal  SKIN: No rashes or lesions                                           --------------------------------------------------------------    ASSESSMENT & PLAN      58 y/o M w/ no PMHx presents w/ dizziness x1 day. PT describes dizziness as room spinning but he is steady on his feet. pt also had abnormal ekg changes at PCP office  Pt had CCTA done in ED which showed mod to severe prox LAD lesion. Pt does not have any chest pain    #Peripheral vertigo possibly d/t BPPV - r/o vertebrobasilar insufficiency  - NIHSS: 0  - Will get MR head - If positive for findings- MRA Head and neck  - Start pt on meclizine 25mg if symptoms persist    #Moderate to Severe proximal LAD lesion - asymptomatic  - ekg no abnormalities; CE x2: negative  - CCTA showed evidence of LAD stenosis  - NPO after midnight for cath today  - C/w asa 81 and lipitor 80  - F/u  Echo  - F/u A1c and lipid profile    #Activiy: OOBC  #DIet: DASH and NPO after midnight  #DVT/GI PPX: Lovenox/not indicated  #Dispo: Home when stable  #Code; Full  #CHG

## 2019-09-17 NOTE — CHART NOTE - NSCHARTNOTEFT_GEN_A_CORE
Preliminary Cardiac Catheterization Post-Procedure Report:09-17-19 @ 15:35    Procedure Performed:  [x] Left Heart Catheterization  [ ] Right Heart Catheterization  [ ] Percutaneous Coronary Intervention    Primary Physician: Dr. Isacc MD  Assistant(s): Dr. Rai MD and Dr. Luis MD    Preliminary Procedure Summary (Official full report to follow)    Pre-procedure diagnosis:   Post Procedure Diagnosis/Impression:    Left Heart Catheterization:  approximate EF%: 70%  [x] Normal Coronary Arteries  [ ] Luminal Irregularities  [ ] non-obstructive CAD  [ ] ** vessel coronary artery disease       Anesthesia Type  [x] conscious sedation  [x] local/regional anesthesia  [  ] general anesthesia    Estimated Blood Loss  [x ] less than 30 ml    Amount of Contrast used:  100 ml    Access  [ ] Rt. Femoral A  [ ] Rt. Femoral V  [x] Rt. Radial A (D-stat)  [ ] Rt. Brachial V    Condition of patient after procedure  [x] stable  [  ] guarded  [  ] satisfactory     CATH SUMMARY/FINDINGS:    Dominance:   [x] Right  [ ] Left                  Coronary circulation: The coronary circulation is right dominant. Otherwise normal coronary arteries. Left main: Normal. LAD: The vessel was normal sized and mildly tortuous. Angiography showed no evidence of disease. 1st diagonal: The vessel was medium sized. Angiography showed no evidence of disease. 2nd diagonal: Angiography showed no evidence of disease. Circumflex: Normal. 1st obtuse marginal: The vessel was small sized. Angiography showed no evidence of disease. 2nd obtuse marginal: The vessel was medium to large sized. Angiography showed no evidence of disease. RCA: The vessel was medium sized and mildly tortuous. Angiography showed no evidence of disease. Right PDA: Normal.     Follow-up Care:  [x] Return to In-patient bed  [x] continue with ASA 81mg, statin  [x ] intensive medical management  F/U with Cardiologist as outpatient

## 2019-09-18 ENCOUNTER — TRANSCRIPTION ENCOUNTER (OUTPATIENT)
Age: 59
End: 2019-09-18

## 2019-09-18 VITALS
SYSTOLIC BLOOD PRESSURE: 121 MMHG | TEMPERATURE: 98 F | RESPIRATION RATE: 18 BRPM | HEART RATE: 71 BPM | DIASTOLIC BLOOD PRESSURE: 72 MMHG

## 2019-09-18 LAB
ANION GAP SERPL CALC-SCNC: 10 MMOL/L — SIGNIFICANT CHANGE UP (ref 7–14)
BASOPHILS # BLD AUTO: 0.05 K/UL — SIGNIFICANT CHANGE UP (ref 0–0.2)
BASOPHILS NFR BLD AUTO: 0.7 % — SIGNIFICANT CHANGE UP (ref 0–1)
BUN SERPL-MCNC: 13 MG/DL — SIGNIFICANT CHANGE UP (ref 10–20)
CALCIUM SERPL-MCNC: 9.1 MG/DL — SIGNIFICANT CHANGE UP (ref 8.5–10.1)
CHLORIDE SERPL-SCNC: 109 MMOL/L — SIGNIFICANT CHANGE UP (ref 98–110)
CO2 SERPL-SCNC: 25 MMOL/L — SIGNIFICANT CHANGE UP (ref 17–32)
CREAT SERPL-MCNC: 0.8 MG/DL — SIGNIFICANT CHANGE UP (ref 0.7–1.5)
EOSINOPHIL # BLD AUTO: 0.2 K/UL — SIGNIFICANT CHANGE UP (ref 0–0.7)
EOSINOPHIL NFR BLD AUTO: 2.8 % — SIGNIFICANT CHANGE UP (ref 0–8)
GLUCOSE SERPL-MCNC: 101 MG/DL — HIGH (ref 70–99)
HCT VFR BLD CALC: 42.7 % — SIGNIFICANT CHANGE UP (ref 42–52)
HGB BLD-MCNC: 14.9 G/DL — SIGNIFICANT CHANGE UP (ref 14–18)
IMM GRANULOCYTES NFR BLD AUTO: 0.3 % — SIGNIFICANT CHANGE UP (ref 0.1–0.3)
LYMPHOCYTES # BLD AUTO: 2.42 K/UL — SIGNIFICANT CHANGE UP (ref 1.2–3.4)
LYMPHOCYTES # BLD AUTO: 34.5 % — SIGNIFICANT CHANGE UP (ref 20.5–51.1)
MAGNESIUM SERPL-MCNC: 2 MG/DL — SIGNIFICANT CHANGE UP (ref 1.8–2.4)
MCHC RBC-ENTMCNC: 34.2 PG — HIGH (ref 27–31)
MCHC RBC-ENTMCNC: 34.9 G/DL — SIGNIFICANT CHANGE UP (ref 32–37)
MCV RBC AUTO: 97.9 FL — HIGH (ref 80–94)
MONOCYTES # BLD AUTO: 0.58 K/UL — SIGNIFICANT CHANGE UP (ref 0.1–0.6)
MONOCYTES NFR BLD AUTO: 8.3 % — SIGNIFICANT CHANGE UP (ref 1.7–9.3)
NEUTROPHILS # BLD AUTO: 3.75 K/UL — SIGNIFICANT CHANGE UP (ref 1.4–6.5)
NEUTROPHILS NFR BLD AUTO: 53.4 % — SIGNIFICANT CHANGE UP (ref 42.2–75.2)
NRBC # BLD: 0 /100 WBCS — SIGNIFICANT CHANGE UP (ref 0–0)
PLATELET # BLD AUTO: 144 K/UL — SIGNIFICANT CHANGE UP (ref 130–400)
POTASSIUM SERPL-MCNC: 4.1 MMOL/L — SIGNIFICANT CHANGE UP (ref 3.5–5)
POTASSIUM SERPL-SCNC: 4.1 MMOL/L — SIGNIFICANT CHANGE UP (ref 3.5–5)
RBC # BLD: 4.36 M/UL — LOW (ref 4.7–6.1)
RBC # FLD: 11.8 % — SIGNIFICANT CHANGE UP (ref 11.5–14.5)
SODIUM SERPL-SCNC: 144 MMOL/L — SIGNIFICANT CHANGE UP (ref 135–146)
WBC # BLD: 7.02 K/UL — SIGNIFICANT CHANGE UP (ref 4.8–10.8)
WBC # FLD AUTO: 7.02 K/UL — SIGNIFICANT CHANGE UP (ref 4.8–10.8)

## 2019-09-18 PROCEDURE — 99233 SBSQ HOSP IP/OBS HIGH 50: CPT

## 2019-09-18 PROCEDURE — 99222 1ST HOSP IP/OBS MODERATE 55: CPT

## 2019-09-18 PROCEDURE — 93306 TTE W/DOPPLER COMPLETE: CPT | Mod: 26

## 2019-09-18 RX ORDER — CHLORHEXIDINE GLUCONATE 213 G/1000ML
1 SOLUTION TOPICAL
Refills: 0 | Status: DISCONTINUED | OUTPATIENT
Start: 2019-09-18 | End: 2019-09-18

## 2019-09-18 RX ORDER — ATORVASTATIN CALCIUM 80 MG/1
1 TABLET, FILM COATED ORAL
Qty: 0 | Refills: 0 | DISCHARGE
Start: 2019-09-18

## 2019-09-18 RX ORDER — MECLIZINE HCL 12.5 MG
1 TABLET ORAL
Qty: 0 | Refills: 0 | DISCHARGE

## 2019-09-18 RX ORDER — MECLIZINE HCL 12.5 MG
1 TABLET ORAL
Qty: 90 | Refills: 0
Start: 2019-09-18 | End: 2019-10-17

## 2019-09-18 RX ORDER — ASPIRIN/CALCIUM CARB/MAGNESIUM 324 MG
1 TABLET ORAL
Qty: 30 | Refills: 0
Start: 2019-09-18 | End: 2019-10-17

## 2019-09-18 RX ORDER — ASPIRIN/CALCIUM CARB/MAGNESIUM 324 MG
1 TABLET ORAL
Qty: 0 | Refills: 0 | DISCHARGE
Start: 2019-09-18

## 2019-09-18 RX ORDER — ATORVASTATIN CALCIUM 80 MG/1
1 TABLET, FILM COATED ORAL
Qty: 30 | Refills: 0
Start: 2019-09-18 | End: 2019-10-17

## 2019-09-18 RX ADMIN — Medication 81 MILLIGRAM(S): at 12:17

## 2019-09-18 RX ADMIN — ENOXAPARIN SODIUM 40 MILLIGRAM(S): 100 INJECTION SUBCUTANEOUS at 12:17

## 2019-09-18 NOTE — PROGRESS NOTE ADULT - ASSESSMENT
58 y/o M w/ no PMHx presents w/ dizziness x1 day. PT describes dizziness as room spinning but he is steady on his feet. pt also had abnormal ekg changes at PCP office  Pt had CCTA done in ED which showed mod to severe prox LAD lesion. Pt does not have any chest pain    #Peripheral vertigo possibly d/t BPPV - r/o vertebrobasilar insufficiency  - NIHSS: 0  - Will get MR head - If positive for findings- MRA Head and neck  - Start pt on meclizine 25mg if symptoms persist   orhtostatics? hyponatrema on admsiion? dehdyration?    #Moderate to Severe proximal LAD lesion - asymptomatic  - ekg no abnormalities; CE x2: negative  - CCTA showed evidence of LAD stenosis  - s/p cardiac cath (9/18)  - Angiograhy showed no evidence of disease  - C/w asa 81 and lipitor 80  - F/u  Echo  - F/u A1c 5.3 and lipid profile    #Activiy: OOBC  #DIet: DASH and NPO after midnight  #DVT/GI PPX: Lovenox/not indicated  #Dispo: Home when stable  #Code; Full  #CHG 60 y/o M w/ no PMHx presents w/ dizziness x1 day. PT describes dizziness as room spinning but he is steady on his feet. pt also had abnormal ekg changes at PCP office  Pt had CCTA done in ED which showed mod to severe prox LAD lesion. Pt does not have any chest pain    #Peripheral vertigo possibly d/t BPPV - r/o vertebrobasilar insufficiency  - NIHSS: 0  --MRI Brain w/o contrast: no acute recent infarct or acute intracranial hemorrhage.  - Start pt on meclizine 25mg if symptoms persist  -orthostatics: negative   -f/u neuro consult     #Moderate to Severe proximal LAD lesion - asymptomatic  - ekg no abnormalities; CE x2: negative  - CCTA showed evidence of LAD stenosis  - s/p cardiac cath (9/18)  - Angiograhy showed no evidence of disease  - C/w asa 81 and lipitor 80  - F/u  Echo  - A1c 5.3  -lipid profile normal        #Activiy: OOBC  #DIet: DASH and NPO after midnight  #DVT/GI PPX: Lovenox/not indicated  #Dispo: Home when stable  #Code; Full  #CHG

## 2019-09-18 NOTE — PROGRESS NOTE ADULT - SUBJECTIVE AND OBJECTIVE BOX
FRANKIE BREAUX  59y  [facility] T1-3A 012 A      Patient is a 59y old  Male who presents with a chief complaint of dizziness (18 Sep 2019 16:04)      INTERVAL HPI/OVERNIGHT EVENTS:    no events overnight     REVIEW OF SYSTEMS:  CONSTITUTIONAL: No fever, weight loss, or fatigue  EYES: No eye pain, visual disturbances, or discharge  ENMT:  No difficulty hearing, tinnitus, vertigo; No sinus or throat pain  NECK: No pain or stiffness  BREASTS: No pain, masses, or nipple discharge  RESPIRATORY: No cough, wheezing, chills or hemoptysis; No shortness of breath  CARDIOVASCULAR: No chest pain, palpitations, dizziness, or leg swelling  GASTROINTESTINAL: No abdominal or epigastric pain. No nausea, vomiting, or hematemesis; No diarrhea or constipation. No melena or hematochezia.  GENITOURINARY: No dysuria, frequency, hematuria, or incontinence  NEUROLOGICAL: No headaches, memory loss, loss of strength, numbness, or tremors  SKIN: No itching, burning, rashes, or lesions   LYMPH NODES: No enlarged glands  ENDOCRINE: No heat or cold intolerance; No hair loss  MUSCULOSKELETAL: No joint pain or swelling; No muscle, back, or extremity pain  PSYCHIATRIC: No depression, anxiety, mood swings, or difficulty sleeping  HEME/LYMPH: No easy bruising, or bleeding gums  ALLERY AND IMMUNOLOGIC: No hives or eczema  FAMILY HISTORY:  No pertinent family history in first degree relatives    T(C): 36.4 (09-18-19 @ 14:49), Max: 36.4 (09-17-19 @ 18:59)  HR: 71 (09-18-19 @ 14:49) (63 - 71)  BP: 121/72 (09-18-19 @ 14:49) (101/56 - 121/72)  RR: 18 (09-18-19 @ 14:49) (18 - 18)  SpO2: --  Wt(kg): --Vital Signs Last 24 Hrs  T(C): 36.4 (18 Sep 2019 14:49), Max: 36.4 (17 Sep 2019 18:59)  T(F): 97.5 (18 Sep 2019 14:49), Max: 97.6 (17 Sep 2019 18:59)  HR: 71 (18 Sep 2019 14:49) (63 - 71)  BP: 121/72 (18 Sep 2019 14:49) (101/56 - 121/72)  BP(mean): --  RR: 18 (18 Sep 2019 14:49) (18 - 18)  SpO2: --    PHYSICAL EXAM:  GENERAL: NAD, well-groomed, well-developed  HEAD:  Atraumatic, Normocephalic  EYES: EOMI, PERRLA, conjunctiva and sclera clear  ENMT: No tonsillar erythema, exudates, or enlargement; Moist mucous membranes, Good dentition, No lesions  NECK: Supple, No JVD, Normal thyroid  NERVOUS SYSTEM:  Alert & Oriented X3, Good concentration; Motor Strength 5/5 B/L upper and lower extremities; DTRs 2+ intact and symmetric  PULM: Clear to auscultation bilaterally  CARDIAC: Regular rate and rhythm; No murmurs, rubs, or gallops  GI: Soft, Nontender, Nondistended; Bowel sounds present  EXTREMITIES:  2+ Peripheral Pulses, No clubbing, cyanosis, or edema  LYMPH: No lymphadenopathy noted  SKIN: No rashes or lesions    Consultant(s) Notes Reviewed:  [x ] YES  [ ] NO  Care Discussed with Consultants/Other Providers [ x] YES  [ ] NO    LABS:                            14.9   7.02  )-----------( 144      ( 18 Sep 2019 06:10 )             42.7   09-18    144  |  109  |  13  ----------------------------<  101<H>  4.1   |  25  |  0.8    Ca    9.1      18 Sep 2019 06:10  Mg     2.0     09-18              aspirin enteric coated 81 milliGRAM(s) Oral daily  atorvastatin 80 milliGRAM(s) Oral at bedtime  chlorhexidine 4% Liquid 1 Application(s) Topical <User Schedule>  enoxaparin Injectable 40 milliGRAM(s) SubCutaneous daily  influenza   Vaccine 0.5 milliLiter(s) IntraMuscular once      HEALTH ISSUES - PROBLEM Dx:          Case Discussed with House Staff     Spectra x31

## 2019-09-18 NOTE — PROGRESS NOTE ADULT - SUBJECTIVE AND OBJECTIVE BOX
58 yo male with no PMHx presents with dizziness and room spinning.     Subjective complaints - Patient examined this am at bedside. Pt does not understand English, spoke with the patient with the help of daughter translating- Pt does not have any new complaints this am. Patient denies dizziness, nausea, vomiting or chest pain this morning.    OBJECTIVE  PAST MEDICAL & SURGICAL HISTORY  No pertinent past medical history  No significant past surgical history      ALLERGIES:  Allergy Status Unknown      MEDICATIONS:  STANDING MEDICATIONS  aspirin enteric coated 81 milliGRAM(s) Oral daily  atorvastatin 80 milliGRAM(s) Oral at bedtime  enoxaparin Injectable 40 milliGRAM(s) SubCutaneous daily  influenza   Vaccine 0.5 milliLiter(s) IntraMuscular once  sodium chloride 0.9%. 1000 milliLiter(s) IV Continuous <Continuous>    PRN MEDICATIONS    VITAL SIGNS: Last 24 Hours  Temp: 97  HR: 63  BP: 101/56  RR:18    LABS:                        14.9   7.02 )-----------( 144      ( 18 Sep 2019 06:10 )           42.7    09-18    144  |  109  |  13  ----------------------<  101  4.1   |  25  |  0.8    Ca    9.1    18 Sep 2019 06:10    TPro  6.9  /  Alb  4.3  /  TBili  0.7  /  DBili  x   /  AST  29  /  ALT  29  /  AlkPhos  64  09-15      CARDIAC MARKERS ( 16 Sep 2019 00:16 )  x     / <0.01 ng/mL / x     / x     / x      CARDIAC MARKERS ( 15 Sep 2019 19:05 )  x     / <0.01 ng/mL / x     / x     / x                                                   RADIOLOGY:  < from: CT Heart with Coronaries (09.16.19 @ 10:18) >  IMPRESSION:    1.  Atherosclerotic changes of the above-mentioned proximal segment of   LAD with moderate tosevere stenosis.   2.  Normal right and left circumflex arteries.  3.  Total coronary calcium score is 84.  For a 59 years old male , this   corresponds to 70 URIBE percentile rank.     < from: Xray Chest 2 Views PA/Lat (09.15.19 @ 19:35) >  Impression:      No radiographic evidence of acute cardiopulmonary disease.      MRI Brain w/o Contrast  PROCEDURE DATE:  09/17/2019      INTERPRETATION:  Clinical History / Reason for exam: Dizziness, vertigo    TECHNIQUE: MRI brain without contrast. Multiplanar multisequential MRI of   the brain without intravenous contrast administration on the 3 Zamzam   magnet.     COMPARISON: None available    IMPRESSION:     1.  No evidence of recent infarct or acute intracranial hemorrhage.    2.  Scattered punctate foci of white matter signal abnormality,   nonspecific and likely reflecting mild chronic microvascular changes.            Left heart catheterization with ventriculography.    --  Coronary angiography.    --  Hemostasis w/ D-stat radial closure device.       CORONARY CIRCULATION: Otherwise normal coronary arteries. Left main:    Normal. LAD: The vessel was normal sized. Angiography showed no evidence    of disease. 1st diagonal: The vessel was medium sized. Angiography showed    no evidence of disease. 2nd diagonal: Angiography showed no evidence of    disease. Circumflex: Normal. 1st obtuse marginal: The vessel was small    sized. Angiography showed no evidence of disease. 2nd obtuse marginal: The    vessel was medium to large sized. Angiography showed no evidence of    disease. RCA: The vessel was medium sized and mildly tortuous. Angiography    showed no evidence of disease.      PHYSICAL EXAM:    GENERAL: NAD, AAOx3  HEENT:  Atraumatic, Normocephalic.  PERRLA, conjunctiva and sclera clear, No JVD  PULMONARY: Clear to auscultation bilaterally; No wheeze  CARDIOVASCULAR: Regular rate and rhythm  GASTROINTESTINAL: Soft, Nontender, Nondistended; Bowel sounds present  MUSCULOSKELETAL:  2+ Peripheral Pulses, No clubbing, cyanosis, or edema  NEUROLOGY: non-focal  SKIN: No rashes or lesions

## 2019-09-18 NOTE — DISCHARGE NOTE PROVIDER - PROVIDER TOKENS
FREE:[LAST:[elise],FIRST:[jay],PHONE:[(   )    -],FAX:[(   )    -],ADDRESS:[[address] [place] [address]],FOLLOWUP:[1 week]]

## 2019-09-18 NOTE — DISCHARGE NOTE PROVIDER - CARE PROVIDER_API CALL
jay olivares  [address] [place] [id number]  Phone: (   )    -  Fax: (   )    -  Follow Up Time: 1 week

## 2019-09-18 NOTE — PROGRESS NOTE ADULT - ASSESSMENT
#Dizziness secondary to vertigo secondary to BPPV   ruled out Neuro caridogenic etiology   MRI neg  Cath neg    #Suspicion for CAD on CCTA -> negative angiography      DC HOME , follow up ENT outpatient for vestibular therapy , time spent 45 min , family and patient agreeable to plan of care

## 2019-09-18 NOTE — DISCHARGE NOTE PROVIDER - HOSPITAL COURSE
60 y/o M w/ no PMHx presents w/ dizziness x1 day. PT describes dizziness as room spinning but he is steady on his feet. Pt states he did not have any hearing loss or tinnitus a/w it. He noticed dizzinss when he changed posture from lying down to standing up at home. Episode lasted 2-3 minutes but recurred which is why he came to the ED. Pt went to PCP first who did an ekg and found some abnormalities and was referred to ED. Denies any chest pain, dizziness, confusion, ams, viral symptoms, or any focal deficits.    CCTA showed evidence pf LAD stenosis and Ca score was 84. Patient underwent cath and    angiography didn't show any evidence of CAD. Pt will d/c with aspirin, and Lipitor,     Pt was c/s with neurology for dizziness and vertigo, MRI is negative.  Neuro thinks BPPV will d/c with out patient f/u

## 2019-09-18 NOTE — DISCHARGE NOTE NURSING/CASE MANAGEMENT/SOCIAL WORK - PATIENT PORTAL LINK FT
You can access the FollowMyHealth Patient Portal offered by [facility] by registering at the following website: [email or web address]. By joining [employer]’s FollowMyHealth portal, you will also be able to view your health information using other applications (apps) compatible with our system.

## 2019-09-18 NOTE — CONSULT NOTE ADULT - SUBJECTIVE AND OBJECTIVE BOX
ATTENDING NOTE WILL FOLLOW.    Neurology Consult - FRANKIE BREAUX    Patient is a 59y old  Male who presents with a chief complaint of dizziness (18 Sep 2019 06:31)      HPI:  The patient is 60 y/o Cantonese-speaking male w/ no PMHx who presented to the ED w/ dizziness x1 day; denies LOC. Neurology was consulted for possible vertigo. Hx was obtained from family at patient bedside. Pt describes dizziness as room spinning but he is steady on his feet. He noticed dizziness when he changed posture from lying down to standing up at home while eating. Denied any hearing loss or tinnitus along w/ episode. Episode lasted 2-3 minutes but recurred which is why he came to the ED. Of note, before eating patient exercised with rapid movements and did not hydrate adequately. Pt denied feeling like this before after working out. Pt went to PCP afterwards who did an ECG and found some abnormalities and was subsequently referred to ED. ECG in ER showed sinus rhythm. Pt was admitted for observation. CCTA revealed moderate proximal LAD stenosis; however, cardiac cath revealed no coronary artery stenosis and normal LAD. Orthostatic vitals were negative. MRI head was completed revealing no acute infarction or hemorrhage, but chronic microvascular changes.     While patient was examined at bedside this morning with family present, he offers no complaints. He no longer feels dizzy and states he is much improved after receiving meclizine 25mg. Denies any headaches, hearing loss, tinnitus, chest pain, dizziness, confusion, nausea, vomiting, fevers/chills, or any focal deficits. VS unremarkable.      PAST MEDICAL & SURGICAL HISTORY:  No pertinent past medical history  No significant past surgical history      FAMILY HISTORY:  No pertinent family history in first degree relatives      Social History: (-) x 3    Allergies    Allergy Status Unknown    Intolerances        MEDICATIONS  (STANDING):  aspirin enteric coated 81 milliGRAM(s) Oral daily  atorvastatin 80 milliGRAM(s) Oral at bedtime  chlorhexidine 4% Liquid 1 Application(s) Topical <User Schedule>  enoxaparin Injectable 40 milliGRAM(s) SubCutaneous daily  influenza   Vaccine 0.5 milliLiter(s) IntraMuscular once    MEDICATIONS  (PRN):      Review of systems:    Constitutional: as per HPI  Eyes: No eye pain or discharge  ENMT:  No difficulty hearing; No sinus or throat pain  Neck: No pain or stiffness  Respiratory: No cough, wheezing, chills or hemoptysis  Cardiovascular: No chest pain, palpitations, shortness of breath, dyspnea on exertion  Gastrointestinal: No abdominal pain, nausea, vomiting or hematemesis; No diarrhea or constipation.   Genitourinary: No dysuria, frequency, hematuria or incontinence  Neurological: As per HPI  Skin: No rashes or lesions   Endocrine: No heat or cold intolerance; No hair loss  Musculoskeletal: No joint pain or swelling  Psychiatric: No depression, anxiety, mood swings  Heme/Lymph: No easy bruising or bleeding gums    Vital Signs Last 24 Hrs  T(C): 36.4 (18 Sep 2019 14:49), Max: 36.4 (17 Sep 2019 18:59)  T(F): 97.5 (18 Sep 2019 14:49), Max: 97.6 (17 Sep 2019 18:59)  HR: 71 (18 Sep 2019 14:49) (63 - 71)  BP: 121/72 (18 Sep 2019 14:49) (101/56 - 121/72)  BP(mean): --  RR: 18 (18 Sep 2019 14:49) (18 - 18)  SpO2: --            Examination:  General:  Appearance is consistent with chronologic age.  No abnormal facies.  Gross skin survey within normal limits.    Cognitive/Language:  The patient is oriented to person, place, time and date.  Recent and remote memory intact.  Fund of knowledge is intact and normal.  Language with normal repetition, comprehension and naming.  Nondysarthric.    Eyes: intact VA, VFF.  EOMI w/o nystagmus, skew or reported double vision.  PERRL.  No ptosis/weakness of eyelid closure.    Face:  Facial sensation normal V1 - 3, no facial asymmetry.    Ears/Nose/Throat:  Hearing grossly intact b/l.  Palate elevates midline.  Tongue and uvula midline.   Motor examination:   Normal tone, bulk and range of motion.  No tenderness, twitching, tremors or involuntary movements.  Formal Muscle Strength Testing: (MRC grade R/L) 5/5 UE; 5/5 LE.  No observable drift.  Reflexes:   2+ b/l pectoralis, biceps, triceps, brachioradialis, patella and Achilles.  Plantar response downgoing b/l.  Jaw jerk, Wai, clonus absent.  Sensory examination:   Intact to light touch and pinprick, pain, temperature and proprioception and vibration in all extremities.  Cerebellum:   FTN/HKS intact with normal TONE in all limbs.  No dysmetria or dysdiadokinesia.  Gait narrow based and normal. Negative Romberg  Negative Lisbet-Hallpike manuever    Respiratory:  no audible wheezing or inspiratory stridor.  no use of accessory muscles.   Cardiac: pulse palpable, no audible bruits  Abdomen: supple, no guarding, no TTP    Labs:   CBC Full  -  ( 18 Sep 2019 06:10 )  WBC Count : 7.02 K/uL  RBC Count : 4.36 M/uL  Hemoglobin : 14.9 g/dL  Hematocrit : 42.7 %  Platelet Count - Automated : 144 K/uL  Mean Cell Volume : 97.9 fL  Mean Cell Hemoglobin : 34.2 pg  Mean Cell Hemoglobin Concentration : 34.9 g/dL  Auto Neutrophil # : 3.75 K/uL  Auto Lymphocyte # : 2.42 K/uL  Auto Monocyte # : 0.58 K/uL  Auto Eosinophil # : 0.20 K/uL  Auto Basophil # : 0.05 K/uL  Auto Neutrophil % : 53.4 %  Auto Lymphocyte % : 34.5 %  Auto Monocyte % : 8.3 %  Auto Eosinophil % : 2.8 %  Auto Basophil % : 0.7 %    09-18    144  |  109  |  13  ----------------------------<  101<H>  4.1   |  25  |  0.8    Ca    9.1      18 Sep 2019 06:10  Mg     2.0     09-18                Neuroimaging:  [facility]:     09-18-19 @ 15:04    IMPRESSION:     1.  No evidence of recent infarct or acute intracranial hemorrhage.    2.  Scattered punctate foci of white matter signal abnormality,   nonspecific and likely reflecting mild chronic microvascular changes.    < end of copied text >     Left heart catheterization with ventriculography.    --  Coronary angiography.    --  Hemostasis w/ D-stat radial closure device.     < from: CT Heart with Coronaries (09.16.19 @ 10:18) >  IMPRESSION:    1.  Atherosclerotic changes of the above-mentioned proximal segment of   LAD with moderate tosevere stenosis.   2.  Normal right and left circumflex arteries.  3.  Total coronary calcium score is 84.  For a 59 years old male , this   corresponds to 70 [place] percentile rank.     CORONARY CIRCULATION: Otherwise normal coronary arteries. Left main:    Normal. LAD: The vessel was normal sized. Angiography showed no evidence    of disease. 1st diagonal: The vessel was medium sized. Angiography showed    no evidence of disease. 2nd diagonal: Angiography showed no evidence of    disease. Circumflex: Normal. 1st obtuse marginal: The vessel was small    sized. Angiography showed no evidence of disease. 2nd obtuse marginal: The    vessel was medium to large sized. Angiography showed no evidence of    disease. RCA: The vessel was medium sized and mildly tortuous. Angiography    showed no evidence of disease.      Assessment  The patient is 60 y/o Cantonese-speaking male w/ no PMHx who presented to the ED w/ dizziness and room-spinning sensation x1 day; denies LOC. Neurology was consulted for possible vertigo. MRI head negative for any acute changes. Orthostatic vitals neg. Pt started on meclizine 25mg and reports significant improvement of dizziness. Suspect BPPV.    Plan  cleared to d/c home from neurological standpoint  d/c with Meclizine 25mg PO tid PRN

## 2019-09-18 NOTE — DISCHARGE NOTE PROVIDER - NSDCCPCAREPLAN_GEN_ALL_CORE_FT
PRINCIPAL DISCHARGE DIAGNOSIS  Diagnosis: CAD (coronary artery disease)  Assessment and Plan of Treatment: You came in with EKG abnormalities. We repeated EKG, checked cardiac enzymes and performed CT angiogram which showed blecokage in one of coronary arteires. We performed catheterization. We are discharging you on aspirin and lipitor. Please be compliant with your medications and visit your PCP for follow up.      SECONDARY DISCHARGE DIAGNOSES  Diagnosis: Vertigo  Assessment and Plan of Treatment: You came in reporting feeling spinning of your surroundings and feeling dizzy. We cordered MRI and orthuostatics which were normal. Neurologyv was consulted who they that you might have a condition caleed BPPV. Please follow up with your PCP as out patient. We will discharge you on medication meclizine. Please be compliant with your medciations.

## 2019-09-20 DIAGNOSIS — E87.1 HYPO-OSMOLALITY AND HYPONATREMIA: ICD-10-CM

## 2019-09-20 DIAGNOSIS — Z87.891 PERSONAL HISTORY OF NICOTINE DEPENDENCE: ICD-10-CM

## 2019-09-20 DIAGNOSIS — I25.10 ATHEROSCLEROTIC HEART DISEASE OF NATIVE CORONARY ARTERY WITHOUT ANGINA PECTORIS: ICD-10-CM

## 2019-09-20 DIAGNOSIS — R42 DIZZINESS AND GIDDINESS: ICD-10-CM

## 2019-09-20 DIAGNOSIS — H81.10 BENIGN PAROXYSMAL VERTIGO, UNSPECIFIED EAR: ICD-10-CM

## 2019-09-20 DIAGNOSIS — E86.1 HYPOVOLEMIA: ICD-10-CM
